# Patient Record
(demographics unavailable — no encounter records)

---

## 2024-11-02 NOTE — VITALS
[de-identified] : Patient has no pain related to wound site.  [FreeTextEntry5] : Medications reconciled.

## 2024-11-02 NOTE — HISTORY OF PRESENT ILLNESS
[FreeTextEntry1] : 10/10/24 pt was walking down his steps and twisted his right ankle. Later that day he noticed his sock was bloody and was in pain. Pt had wound on his right heel. He went to ER, x - rays negative from fracture. Was told it was an ankle sprain. Wound cleaned and dressed. Told to follow up at [facility]. Patient has been walking in regular shoes and denies any pain to the right ankle or foot at this time, only relates mild tenderness to the right ankle.

## 2024-11-02 NOTE — PHYSICAL EXAM
[2+] : left 2+ [Alert] : alert [Oriented to Person] : oriented to person [Oriented to Place] : oriented to place [de-identified] : AOX3, NAD  [de-identified] : No pain to the lateral ankle ligaments, no pain along the course of the peroneal tendons, no pain along the posterior tibial tendon. No pain at the insertion of the peroneus tendon of the right foot. No pain at the base of the 5th metatarsal  No  pain to the anterior ankle. 5/5 muscle strength for all muscles and tendons crossing the foot and ankle joints, STJ ROM intact b/l. No pain with calf compression b/l. [de-identified] : right lateral posterior callus - previous blister healed with no signs of infection, no drainage, no edema, no erythema  [de-identified] : ACE wrap applied by DPM for ankle support due to sprained ankle.  [FreeTextEntry1] : Right lateral heel - Old blister site- No open wounds  [de-identified] : callus  [de-identified] : Mechanically cleansed with sterile gauze and normal saline. No other treatment  [de-identified] : Dorsalis Pedis: +2 Bilaterally  Posterior Tibialis:  +2 Bilaterally  Doppler pulses:  N/A Extremity color: normal  Extremity temperature: Warm/normal  Capillary refill: < 3 sec  [TWNoteComboBox4] : None [de-identified] : other [de-identified] : None [de-identified] : None [de-identified] : No

## 2024-11-02 NOTE — PHYSICAL EXAM
[2+] : left 2+ [Alert] : alert [Oriented to Person] : oriented to person [Oriented to Place] : oriented to place [de-identified] : AOX3, NAD  [de-identified] : No pain to the lateral ankle ligaments, no pain along the course of the peroneal tendons, no pain along the posterior tibial tendon. No pain at the insertion of the peroneus tendon of the right foot. No pain at the base of the 5th metatarsal  No  pain to the anterior ankle. 5/5 muscle strength for all muscles and tendons crossing the foot and ankle joints, STJ ROM intact b/l. No pain with calf compression b/l. [de-identified] : right lateral posterior callus - previous blister healed with no signs of infection, no drainage, no edema, no erythema  [de-identified] : ACE wrap applied by DPM for ankle support due to sprained ankle.  [FreeTextEntry1] : Right lateral heel - Old blister site- No open wounds  [de-identified] : callus  [de-identified] : Mechanically cleansed with sterile gauze and normal saline. No other treatment  [de-identified] : Dorsalis Pedis: +2 Bilaterally  Posterior Tibialis:  +2 Bilaterally  Doppler pulses:  N/A Extremity color: normal  Extremity temperature: Warm/normal  Capillary refill: < 3 sec  [TWNoteComboBox4] : None [de-identified] : other [de-identified] : None [de-identified] : None [de-identified] : No

## 2024-11-02 NOTE — REVIEW OF SYSTEMS
[Negative] : Musculoskeletal [de-identified] : right lateral heel blister - healed with callus  [de-identified] : schizophrenia

## 2024-11-02 NOTE — HISTORY OF PRESENT ILLNESS
[FreeTextEntry1] : 10/10/24 pt was walking down his steps and twisted his right ankle. Later that day he noticed his sock was bloody and was in pain. Pt had wound on his right heel. He went to ER, x - rays negative from fracture. Was told it was an ankle sprain. Wound cleaned and dressed. Told to follow up at [facility]. Patient has been walking in regular shoes and denies any pain to the right ankle or foot at this time, only relates mild tenderness to the right ankle.

## 2024-11-02 NOTE — ASSESSMENT
[Verbal] : Verbal [Written] : Written [Demo] : Demo [Patient] : Patient [Good - alert, interested, motivated] : Good - alert, interested, motivated [Verbalizes knowledge/Understanding] : Verbalizes knowledge/understanding [Foot Care] : foot care [Signs and symptoms of infection] : sign and symptoms of infection [How and When to Call] : how and when to call [Labs and Tests] : labs and tests [Pain Management] : pain management [Patient responsibility to plan of care] : patient responsibility to plan of care [] : Yes [Stable] : stable [Home] : Home [Ambulatory] : Ambulatory [Not Applicable - Long Term Care/Home Health Agency] : Long Term Care/Home Health Agency: Not Applicable [FreeTextEntry4] : x - rays reviewed that were taken in ER by DPM.  No vascular studies per DPM, pt has no open wounds.  Pt to follow up in 4 weeks to reassess right ankle sprain.  [FreeTextEntry2] : Pain management

## 2024-11-02 NOTE — REVIEW OF SYSTEMS
[Negative] : Musculoskeletal [de-identified] : right lateral heel blister - healed with callus  [de-identified] : schizophrenia

## 2024-11-02 NOTE — HISTORY OF PRESENT ILLNESS
[FreeTextEntry1] : 10/10/24 pt was walking down his steps and twisted his right ankle. Later that day he noticed his sock was bloody and was in pain. Pt had wound on his right heel. He went to ER, x - rays negative from fracture. Was told it was an ankle sprain. Wound cleaned and dressed. Told to follow up at [facility]. Patient has been walking in regular shoes and denies any pain to the right ankle or foot at this time, only relates mild tenderness to the right ankle.

## 2024-11-02 NOTE — HISTORY OF PRESENT ILLNESS
[FreeTextEntry1] : 10/10/24 pt was walking down his steps and twisted his right ankle. Later that day he noticed his sock was bloody and was in pain. Pt had wound on his right heel. He went to ER, x - rays negative from fracture. Was told it was an ankle sprain. Wound cleaned and dressed. Told to follow up at [facility]. Patient has been walking in regular shoes and denies any pain to the right ankle or foot at this time, only relates mild tenderness to the right ankle.

## 2024-11-02 NOTE — PHYSICAL EXAM
[2+] : left 2+ [Alert] : alert [Oriented to Person] : oriented to person [Oriented to Place] : oriented to place [de-identified] : AOX3, NAD  [de-identified] : No pain to the lateral ankle ligaments, no pain along the course of the peroneal tendons, no pain along the posterior tibial tendon. No pain at the insertion of the peroneus tendon of the right foot. No pain at the base of the 5th metatarsal  No  pain to the anterior ankle. 5/5 muscle strength for all muscles and tendons crossing the foot and ankle joints, STJ ROM intact b/l. No pain with calf compression b/l. [de-identified] : right lateral posterior callus - previous blister healed with no signs of infection, no drainage, no edema, no erythema  [de-identified] : ACE wrap applied by DPM for ankle support due to sprained ankle.  [FreeTextEntry1] : Right lateral heel - Old blister site- No open wounds  [de-identified] : callus  [de-identified] : Mechanically cleansed with sterile gauze and normal saline. No other treatment  [de-identified] : Dorsalis Pedis: +2 Bilaterally  Posterior Tibialis:  +2 Bilaterally  Doppler pulses:  N/A Extremity color: normal  Extremity temperature: Warm/normal  Capillary refill: < 3 sec  [TWNoteComboBox4] : None [de-identified] : other [de-identified] : None [de-identified] : None [de-identified] : No

## 2024-11-02 NOTE — PHYSICAL EXAM
[2+] : left 2+ [Alert] : alert [Oriented to Person] : oriented to person [Oriented to Place] : oriented to place [de-identified] : AOX3, NAD  [de-identified] : No pain to the lateral ankle ligaments, no pain along the course of the peroneal tendons, no pain along the posterior tibial tendon. No pain at the insertion of the peroneus tendon of the right foot. No pain at the base of the 5th metatarsal  No  pain to the anterior ankle. 5/5 muscle strength for all muscles and tendons crossing the foot and ankle joints, STJ ROM intact b/l. No pain with calf compression b/l. [de-identified] : right lateral posterior callus - previous blister healed with no signs of infection, no drainage, no edema, no erythema  [de-identified] : ACE wrap applied by DPM for ankle support due to sprained ankle.  [FreeTextEntry1] : Right lateral heel - Old blister site- No open wounds  [de-identified] : callus  [de-identified] : Mechanically cleansed with sterile gauze and normal saline. No other treatment  [de-identified] : Dorsalis Pedis: +2 Bilaterally  Posterior Tibialis:  +2 Bilaterally  Doppler pulses:  N/A Extremity color: normal  Extremity temperature: Warm/normal  Capillary refill: < 3 sec  [TWNoteComboBox4] : None [de-identified] : other [de-identified] : None [de-identified] : None [de-identified] : No

## 2024-11-02 NOTE — PLAN
[FreeTextEntry1] : .Patient seen and evaluated. Discussed etiology and treatment plan. Patient verbalized understanding. Patient with no pain to the right foot and ankle. Discussed and reviewed x- rays with no significant findings, Patient to limit high intensity activities at this time. Patient with no open wounds. RTc in 4 weeks to reassess the right ankle. Spent 30 minutes for patient care and medical decision making.

## 2024-11-02 NOTE — ASSESSMENT
[Verbal] : Verbal [Written] : Written [Demo] : Demo [Patient] : Patient [Good - alert, interested, motivated] : Good - alert, interested, motivated [Verbalizes knowledge/Understanding] : Verbalizes knowledge/understanding [Foot Care] : foot care [Signs and symptoms of infection] : sign and symptoms of infection [How and When to Call] : how and when to call [Labs and Tests] : labs and tests [Pain Management] : pain management [Patient responsibility to plan of care] : patient responsibility to plan of care [] : Yes [Stable] : stable [Home] : Home [Ambulatory] : Ambulatory [Not Applicable - Long Term Care/Home Health Agency] : Long Term Care/Home Health Agency: Not Applicable [FreeTextEntry2] : Pain management  [FreeTextEntry4] : x - rays reviewed that were taken in ER by DPM.  No vascular studies per DPM, pt has no open wounds.  Pt to follow up in 4 weeks to reassess right ankle sprain.

## 2024-11-02 NOTE — REVIEW OF SYSTEMS
[Negative] : Musculoskeletal [de-identified] : right lateral heel blister - healed with callus  [de-identified] : schizophrenia

## 2024-11-02 NOTE — REVIEW OF SYSTEMS
[Negative] : Musculoskeletal [de-identified] : right lateral heel blister - healed with callus  [de-identified] : schizophrenia

## 2024-11-02 NOTE — VITALS
[de-identified] : Patient has no pain related to wound site.  [FreeTextEntry5] : Medications reconciled.

## 2024-11-02 NOTE — VITALS
[de-identified] : Patient has no pain related to wound site.  [FreeTextEntry5] : Medications reconciled.

## 2024-11-02 NOTE — VITALS
[de-identified] : Patient has no pain related to wound site.  [FreeTextEntry5] : Medications reconciled.